# Patient Record
Sex: FEMALE | Race: WHITE | Employment: UNEMPLOYED | URBAN - METROPOLITAN AREA
[De-identification: names, ages, dates, MRNs, and addresses within clinical notes are randomized per-mention and may not be internally consistent; named-entity substitution may affect disease eponyms.]

---

## 2019-01-31 ENCOUNTER — OFFICE VISIT (OUTPATIENT)
Dept: GASTROENTEROLOGY | Facility: CLINIC | Age: 49
End: 2019-01-31
Payer: COMMERCIAL

## 2019-01-31 VITALS — HEART RATE: 90 BPM | WEIGHT: 170.5 LBS | DIASTOLIC BLOOD PRESSURE: 72 MMHG | SYSTOLIC BLOOD PRESSURE: 104 MMHG

## 2019-01-31 DIAGNOSIS — R13.19 ESOPHAGEAL DYSPHAGIA: ICD-10-CM

## 2019-01-31 DIAGNOSIS — K76.0 FATTY LIVER: Primary | ICD-10-CM

## 2019-01-31 DIAGNOSIS — K21.9 GASTROESOPHAGEAL REFLUX DISEASE, ESOPHAGITIS PRESENCE NOT SPECIFIED: ICD-10-CM

## 2019-01-31 PROCEDURE — 99204 OFFICE O/P NEW MOD 45 MIN: CPT | Performed by: PHYSICIAN ASSISTANT

## 2019-01-31 RX ORDER — PANTOPRAZOLE SODIUM 40 MG/1
40 TABLET, DELAYED RELEASE ORAL DAILY
Qty: 30 TABLET | Refills: 2 | Status: SHIPPED | OUTPATIENT
Start: 2019-01-31

## 2019-01-31 RX ORDER — RANITIDINE HCL 75 MG
75 TABLET ORAL 2 TIMES DAILY
COMMUNITY

## 2019-01-31 NOTE — PROGRESS NOTES
[facility]) [facility]  Candance Francis 53 y o  female MRN: [id number]       CC:  New patient to establish for fatty liver and right-sided abdominal pain    HPI:  Mckenzie Guerrero is a 60-year-old female with history of hypothyroidism and biliary dyskinesia status post cholecystectomy  Patient is here by a referral of her PCP for right upper quadrant pain and fatty liver seen on ultrasound  Patient reports that several weeks ago, she began to have new onset right-sided abdominal pain that would radiate to her back  She believes this was secondary to muscle strain as she was very active during this time  However, she then developed increasing bloating and heartburn as if she were having biliary colic  She also reports intermittent dysphagia to solid foods  Patient has a long history of GERD  She has been on several PPIs and H2 blockers in the past   She has attempted to wean off of these medications as she has a special needs daughter that is on PPI and understands the risks associated with this  She has been taking Zantac intermittently at this point  Because of her complaints, her PCP ordered a right upper quadrant ultrasound  This revealed a fatty liver  She also had normal liver enzymes  However, patient reports that in the past when her thyroid function was not within range she would have elevated liver enzymes  When asked about recent weight gain, she does report weight gain since moving back from [place] and eating fatty meats/eggs/penaut butter  Patient has never had an EGD or colonoscopy  Patient reports that she had a grandfather with cancer involving the duodenum and pancreas  Review of Systems:    CONSTITUTIONAL: Denies any fever, chills, or rigors  Good appetite, and no recent weight loss  HEENT: No earache or tinnitus  Denies hearing loss or visual disturbances  CARDIOVASCULAR: No chest pain or palpitations     RESPIRATORY: Denies any cough, hemoptysis, shortness of breath or dyspnea on exertion  GASTROINTESTINAL: As noted in the History of Present Illness  GENITOURINARY: No problems with urination  Denies any hematuria or dysuria  NEUROLOGIC: No dizziness or vertigo, denies headaches  MUSCULOSKELETAL: Denies any muscle or joint pain  SKIN: Denies skin rashes or itching  ENDOCRINE: Denies excessive thirst  Denies intolerance to heat or cold  PSYCHOSOCIAL: Denies depression or anxiety  Denies any recent memory loss  Current Outpatient Prescriptions   Medication Sig Dispense Refill    Fluticasone Propionate (FLONASE ALLERGY RELIEF NA) into each nostril      levothyroxine 25 mcg/mL SUSP Take by mouth daily in the early morning      ranitidine (ZANTAC) 75 MG tablet Take 75 mg by mouth 2 (two) times a day      pantoprazole (PROTONIX) 40 mg tablet Take 1 tablet (40 mg total) by mouth daily 30 tablet 2     No current facility-administered medications for this visit        Past Medical History:   Diagnosis Date    Chronic heartburn     Disease of thyroid gland     Hyperglycemia      Past Surgical History:   Procedure Laterality Date    APPENDECTOMY      CHOLECYSTECTOMY       Social History     Social History    Marital status: /Civil Union     Spouse name: N/A    Number of children: N/A    Years of education: N/A     Social History Main Topics    Smoking status: Former Smoker    Smokeless tobacco: Never Used    Alcohol use Yes      Comment: ONLY TILL SHE WAS 26YEAR OLD    Drug use: No    Sexual activity: Not Asked     Other Topics Concern    None     Social History Narrative    None     Family History   Problem Relation Age of Onset    Hypertension Mother     Diabetes Father     Hypertension Father     Diabetes Maternal Grandmother     Hypertension Maternal Grandmother     Breast cancer Maternal Grandfather             PHYSICAL EXAM:    Vitals:    01/31/19 1005   BP: 104/72   Pulse: 90   Weight: 77 3 kg (170 lb 8 oz)     General Appearance:   Alert and oriented x 3  Cooperative, and in no respiratory distress   HEENT:   Normocephalic, atraumatic, anicteric      Neck:  Supple, symmetrical, trachea midline   Lungs:   Clear to auscultation bilaterally    Heart[de-identified]   Regular rate and rhythm   Abdomen:   Soft, non-tender, non-distended; normal bowel sounds; no masses, no organomegaly    Genitalia:   Deferred    Rectal:   Deferred    Extremities:  No cyanosis, clubbing or edema    Pulses:  2+ and symmetric all extremities    Skin:  Skin color, texture, turgor normal, no rashes or lesions    Lymph nodes:  No palpable cervical or supraclavicular lymphadenopathy            ASSESSMENT and PLAN:      1) RUQ pain with pyrosis and bloating - Patient is status post cholecystectomy  She had a right upper quadrant ultrasound with her primary care that revealed fatty liver  Likely her symptoms are secondary to uncontrolled GERD  She had normal LFTs  - Recommend EGD to investigate and screen for Hughes's esophagus  - Will start patient on Protonix 40 mg daily  - GERD diet given and discussed    2) Intermittent dysphagia - May be secondary to esophagitis induced by reflux, structural abnormality or less likely motility issue   - Plan as above    3) Kathryn NAFLD - Patient does not drink alcohol  She has not had her cholesterol checked since 2017  Otherwise, she does not have any family history of liver disease to her knowledge  In the past, she does note that her liver enzymes were elevated when her thyroid function is not within normal limits  - Will perform liver workup  - If patient has elevated cholesterol, she will need to follow up with her PCP regarding this  - Fortunately, patient does not have elevated LFTs  Could also consider vitamin-E supplement  - Lifestyle modifications     - Offered patient colonoscopy if her insurance will cover screening as she is over 40, however patient would like to hold off until 49   She has no alarm symptoms or family history of CRC       Follow up after EGD

## 2019-01-31 NOTE — LETTER
January 31, 2019     Calderon Abdi, DO  [address] [place] [address]    Patient: Duane Schuler   YOB: 1970   Date of Visit: 1/31/2019       Dear Dr Cherly Schilder: Thank you for referring Fortino Fallon to me for evaluation  Below are my notes for this consultation  If you have questions, please do not hesitate to call me  I look forward to following your patient along with you  Sincerely,        Theo Bence, PA-C        CC: No Recipients  Marcus Francis  1/31/2019 10:43 AM  Sign at close encounter  [address] [facility]  Duane Schuler 53 y o  female MRN: [id number]       CC:  New patient to establish for fatty liver and right-sided abdominal pain    HPI:  Anais Kline is a 50-year-old female with history of hypothyroidism and biliary dyskinesia status post cholecystectomy  Patient is here by a referral of her PCP for right upper quadrant pain and fatty liver seen on ultrasound  Patient reports that several weeks ago, she began to have new onset right-sided abdominal pain that would radiate to her back  She believes this was secondary to muscle strain as she was very active during this time  However, she then developed increasing bloating and heartburn as if she were having biliary colic  She also reports intermittent dysphagia to solid foods  Patient has a long history of GERD  She has been on several PPIs and H2 blockers in the past   She has attempted to wean off of these medications as she has a special needs daughter that is on PPI and understands the risks associated with this  She has been taking Zantac intermittently at this point  Because of her complaints, her PCP ordered a right upper quadrant ultrasound  This revealed a fatty liver  She also had normal liver enzymes  However, patient reports that in the past when her thyroid function was not within range she would have elevated liver enzymes    When asked about recent weight gain, she does report weight gain since moving back from [place] and eating fatty meats/eggs/penaut butter  Patient has never had an EGD or colonoscopy  Patient reports that she had a grandfather with cancer involving the duodenum and pancreas  Review of Systems:    CONSTITUTIONAL: Denies any fever, chills, or rigors  Good appetite, and no recent weight loss  HEENT: No earache or tinnitus  Denies hearing loss or visual disturbances  CARDIOVASCULAR: No chest pain or palpitations  RESPIRATORY: Denies any cough, hemoptysis, shortness of breath or dyspnea on exertion  GASTROINTESTINAL: As noted in the History of Present Illness  GENITOURINARY: No problems with urination  Denies any hematuria or dysuria  NEUROLOGIC: No dizziness or vertigo, denies headaches  MUSCULOSKELETAL: Denies any muscle or joint pain  SKIN: Denies skin rashes or itching  ENDOCRINE: Denies excessive thirst  Denies intolerance to heat or cold  PSYCHOSOCIAL: Denies depression or anxiety  Denies any recent memory loss  Current Outpatient Prescriptions   Medication Sig Dispense Refill    Fluticasone Propionate (FLONASE ALLERGY RELIEF NA) into each nostril      levothyroxine 25 mcg/mL SUSP Take by mouth daily in the early morning      ranitidine (ZANTAC) 75 MG tablet Take 75 mg by mouth 2 (two) times a day      pantoprazole (PROTONIX) 40 mg tablet Take 1 tablet (40 mg total) by mouth daily 30 tablet 2     No current facility-administered medications for this visit        Past Medical History:   Diagnosis Date    Chronic heartburn     Disease of thyroid gland     Hyperglycemia      Past Surgical History:   Procedure Laterality Date    APPENDECTOMY      CHOLECYSTECTOMY       Social History     Social History    Marital status: /Civil Union     Spouse name: N/A    Number of children: N/A    Years of education: N/A     Social History Main Topics    Smoking status: Former Smoker    Smokeless tobacco: Never Used    Alcohol use Yes      Comment: ONLY TILL SHE WAS 26YEAR OLD    Drug use: No    Sexual activity: Not Asked     Other Topics Concern    None     Social History Narrative    None     Family History   Problem Relation Age of Onset    Hypertension Mother     Diabetes Father     Hypertension Father     Diabetes Maternal Grandmother     Hypertension Maternal Grandmother     Breast cancer Maternal Grandfather             PHYSICAL EXAM:    Vitals:    01/31/19 1005   BP: 104/72   Pulse: 90   Weight: 77 3 kg (170 lb 8 oz)     General Appearance:   Alert and oriented x 3  Cooperative, and in no respiratory distress   HEENT:   Normocephalic, atraumatic, anicteric      Neck:  Supple, symmetrical, trachea midline   Lungs:   Clear to auscultation bilaterally    Heart[de-identified]   Regular rate and rhythm   Abdomen:   Soft, non-tender, non-distended; normal bowel sounds; no masses, no organomegaly    Genitalia:   Deferred    Rectal:   Deferred    Extremities:  No cyanosis, clubbing or edema    Pulses:  2+ and symmetric all extremities    Skin:  Skin color, texture, turgor normal, no rashes or lesions    Lymph nodes:  No palpable cervical or supraclavicular lymphadenopathy            ASSESSMENT and PLAN:      1) RUQ pain with pyrosis and bloating - Patient is status post cholecystectomy  She had a right upper quadrant ultrasound with her primary care that revealed fatty liver  Likely her symptoms are secondary to uncontrolled GERD  She had normal LFTs  - Recommend EGD to investigate and screen for Hughes's esophagus  - Will start patient on Protonix 40 mg daily  - GERD diet given and discussed    2) Intermittent dysphagia - May be secondary to esophagitis induced by reflux, structural abnormality or less likely motility issue   - Plan as above    3) Kathryn NAFLD - Patient does not drink alcohol  She has not had her cholesterol checked since 2017  Otherwise, she does not have any family history of liver disease to her knowledge    In the past, she does note that her liver enzymes were elevated when her thyroid function is not within normal limits  - Will perform liver workup  - If patient has elevated cholesterol, she will need to follow up with her PCP regarding this  - Fortunately, patient does not have elevated LFTs  Could also consider vitamin-E supplement  - Lifestyle modifications     - Offered patient colonoscopy if her insurance will cover screening as she is over 40, however patient would like to hold off until 49  She has no alarm symptoms or family history of CRC  Follow up after EGD

## 2019-02-01 ENCOUNTER — APPOINTMENT (OUTPATIENT)
Dept: LAB | Facility: CLINIC | Age: 49
End: 2019-02-01
Payer: COMMERCIAL

## 2019-02-01 DIAGNOSIS — K76.0 FATTY LIVER: ICD-10-CM

## 2019-02-01 LAB
CHOLEST SERPL-MCNC: 186 MG/DL (ref 50–200)
FERRITIN SERPL-MCNC: 121 NG/ML (ref 8–388)
HDLC SERPL-MCNC: 52 MG/DL (ref 40–60)
IRON SATN MFR SERPL: 39 %
IRON SERPL-MCNC: 104 UG/DL (ref 50–170)
LDLC SERPL CALC-MCNC: 122 MG/DL (ref 0–100)
NONHDLC SERPL-MCNC: 134 MG/DL
TIBC SERPL-MCNC: 265 UG/DL (ref 250–450)
TRIGL SERPL-MCNC: 60 MG/DL

## 2019-02-01 PROCEDURE — 86256 FLUORESCENT ANTIBODY TITER: CPT

## 2019-02-01 PROCEDURE — 87340 HEPATITIS B SURFACE AG IA: CPT

## 2019-02-01 PROCEDURE — 82728 ASSAY OF FERRITIN: CPT

## 2019-02-01 PROCEDURE — 86705 HEP B CORE ANTIBODY IGM: CPT

## 2019-02-01 PROCEDURE — 83540 ASSAY OF IRON: CPT

## 2019-02-01 PROCEDURE — 86704 HEP B CORE ANTIBODY TOTAL: CPT

## 2019-02-01 PROCEDURE — 80061 LIPID PANEL: CPT

## 2019-02-01 PROCEDURE — 86038 ANTINUCLEAR ANTIBODIES: CPT

## 2019-02-01 PROCEDURE — 36415 COLL VENOUS BLD VENIPUNCTURE: CPT

## 2019-02-01 PROCEDURE — 83550 IRON BINDING TEST: CPT

## 2019-02-01 PROCEDURE — 86803 HEPATITIS C AB TEST: CPT

## 2019-02-01 PROCEDURE — 86235 NUCLEAR ANTIGEN ANTIBODY: CPT

## 2019-02-02 LAB
ACTIN IGG SERPL-ACNC: 2 UNITS (ref 0–19)
HBV CORE AB SER QL: NORMAL
HBV CORE IGM SER QL: NORMAL
HBV SURFACE AG SER QL: NORMAL
HCV AB SER QL: NORMAL
MITOCHONDRIA M2 IGG SER-ACNC: <20 UNITS (ref 0–20)

## 2019-02-04 ENCOUNTER — TELEPHONE (OUTPATIENT)
Dept: GASTROENTEROLOGY | Facility: CLINIC | Age: 49
End: 2019-02-04

## 2019-02-04 ENCOUNTER — TELEPHONE (OUTPATIENT)
Dept: OTHER | Facility: HOSPITAL | Age: 49
End: 2019-02-04

## 2019-02-04 LAB — RYE IGE QN: NEGATIVE

## 2019-02-04 NOTE — TELEPHONE ENCOUNTER
----- Message from Luis Zuniga PA-C sent at 2/4/2019 12:35 PM EST -----  Please let patient know that her liver testing was negative  Her lousy cholesterol was slightly elevated  Please advise healthy eating  She should have her liver enzymes checked with either us or her PCP every 3-6 months

## 2019-02-04 NOTE — TELEPHONE ENCOUNTER
I tried as well  Looks like the other number I tried had a different name attached to the voicemail  Yes, a letter would be good then

## 2019-02-11 ENCOUNTER — TELEPHONE (OUTPATIENT)
Dept: GASTROENTEROLOGY | Facility: CLINIC | Age: 49
End: 2019-02-11

## 2019-02-11 NOTE — TELEPHONE ENCOUNTER
Dr Regan Landis - Patient received a letter in the mail to call the office   Please call [phone] ty

## 2019-02-11 NOTE — TELEPHONE ENCOUNTER
[facility] called  Patient's procedure requires per authorization from [employer]  Please call True Morales at [phone] ext  with any questions

## 2019-02-13 ENCOUNTER — PREP FOR PROCEDURE (OUTPATIENT)
Dept: GASTROENTEROLOGY | Facility: CLINIC | Age: 49
End: 2019-02-13

## 2019-02-13 DIAGNOSIS — R13.19 ESOPHAGEAL DYSPHAGIA: Primary | ICD-10-CM

## 2019-02-22 ENCOUNTER — ANESTHESIA EVENT (OUTPATIENT)
Dept: PERIOP | Facility: HOSPITAL | Age: 49
End: 2019-02-22

## 2019-02-23 ENCOUNTER — ANESTHESIA (OUTPATIENT)
Dept: PERIOP | Facility: HOSPITAL | Age: 49
End: 2019-02-23

## 2019-05-10 ENCOUNTER — ANESTHESIA EVENT (OUTPATIENT)
Dept: SURGERY | Facility: HOSPITAL | Age: 49
End: 2019-05-10

## 2019-05-11 ENCOUNTER — ANESTHESIA (OUTPATIENT)
Dept: SURGERY | Facility: HOSPITAL | Age: 49
End: 2019-05-11

## 2019-05-21 ENCOUNTER — TELEPHONE (OUTPATIENT)
Dept: GASTROENTEROLOGY | Facility: CLINIC | Age: 49
End: 2019-05-21